# Patient Record
Sex: FEMALE | Race: WHITE | NOT HISPANIC OR LATINO | Employment: FULL TIME | URBAN - METROPOLITAN AREA
[De-identification: names, ages, dates, MRNs, and addresses within clinical notes are randomized per-mention and may not be internally consistent; named-entity substitution may affect disease eponyms.]

---

## 2020-06-01 ENCOUNTER — TRANSCRIBE ORDERS (OUTPATIENT)
Dept: FAMILY MEDICINE CLINIC | Facility: TELEHEALTH | Age: 25
End: 2020-06-01

## 2020-06-01 ENCOUNTER — HOSPITAL ENCOUNTER (OUTPATIENT)
Dept: GENERAL RADIOLOGY | Facility: HOSPITAL | Age: 25
Discharge: HOME | End: 2020-06-01

## 2020-06-01 DIAGNOSIS — S53.114A: Primary | ICD-10-CM

## 2020-06-01 DIAGNOSIS — S53.114A: ICD-10-CM

## 2020-06-01 PROCEDURE — 73090 X-RAY EXAM OF FOREARM: CPT

## 2020-06-01 PROCEDURE — 73090 X-RAY EXAM OF FOREARM: CPT | Performed by: RADIOLOGY

## 2020-06-01 PROCEDURE — 73080 X-RAY EXAM OF ELBOW: CPT | Performed by: RADIOLOGY

## 2020-06-01 PROCEDURE — 73080 X-RAY EXAM OF ELBOW: CPT

## 2020-06-22 ENCOUNTER — TRANSCRIBE ORDERS (OUTPATIENT)
Dept: PHYSICAL THERAPY | Facility: CLINIC | Age: 25
End: 2020-06-22

## 2020-06-22 DIAGNOSIS — S50.11XD CONTUSION OF RIGHT FOREARM, SUBSEQUENT ENCOUNTER: Primary | ICD-10-CM

## 2020-06-23 ENCOUNTER — TREATMENT (OUTPATIENT)
Dept: PHYSICAL THERAPY | Facility: CLINIC | Age: 25
End: 2020-06-23

## 2020-06-23 DIAGNOSIS — M79.631 RIGHT FOREARM PAIN: Primary | ICD-10-CM

## 2020-06-23 DIAGNOSIS — M25.521 RIGHT ELBOW PAIN: ICD-10-CM

## 2020-06-23 PROCEDURE — 97035 APP MDLTY 1+ULTRASOUND EA 15: CPT | Performed by: PHYSICAL THERAPIST

## 2020-06-23 PROCEDURE — 97162 PT EVAL MOD COMPLEX 30 MIN: CPT | Performed by: PHYSICAL THERAPIST

## 2020-06-23 NOTE — PROGRESS NOTES
"  Physical Therapy Initial Evaluation and Plan of Care      Patient: Sarah Delaney   : 1995  Diagnosis/ICD-10 Code:  Right forearm pain [M79.631]  Referring practitioner: SNEHAL Hampton  Date of Initial Visit: 2020  Today's Date: 2020  Patient seen for 1 sessions    Visit Diagnoses:    ICD-10-CM ICD-9-CM   1. Right forearm pain M79.631 729.5   2. Right elbow pain M25.521 719.42              Subjective Evaluation    History of Present Illness  Date of onset: 2020  Mechanism of injury: Pt is currently an employee at [employer]. She stated she was walking through the building and while \"rounding a corner\" she stepped on a \"metal linkage\" on the floor which slid out from under her, causing her to fall. She states she landed straight on her right upper extremity, causing right elbow and forearm pain. She stated she initially thought it was both elbow and forearm, but is now just forearm. The patient reported she immediately filed a claim with work and was seen by [facility] same day. She was seen by SNEHAL Noland and was given a week to see if swelling improved. She returned after a week and was then told to wait two more weeks to monitor improvement. The patient stated she continued to have right forearm and hand tingling with typing at work. She was seen at a follow-up appointment yesterday and referred to PT. The patient states she has had an x-ray performed, with no fractures revealed.    Quality of life: good    Pain  Current pain ratin  At best pain rating: 3  At worst pain ratin  Quality: discomfort, needle-like, sharp, tight, radiating and burning  Relieving factors: ice and medications  Aggravating factors: keyboarding, movement, lifting and sleeping    Hand dominance: right    Diagnostic Tests  X-ray: normal    Patient Goals  Patient goals for therapy: decreased pain and increased strength  Patient goal: Pain free at work     " "      Objective          Observations     Right Elbow   Negative for abrasion, deformity and spasms.     Additional Observation Details  \"Knot\" present on ulna, near olecranon. No tenderness to palpation of elbow.     Palpation     Right   No palpable tenderness to the supinator.   Muscle spasm in the pronator teres. Tenderness of the pronator teres, wrist extensors and wrist flexors.     Active Range of Motion     Right Elbow   Forearm supination: 32 degrees   Forearm pronation: WFL    Additional Active Range of Motion Details  Lacks three degrees from full right elbow extension.    Joint Play     Right Elbow   Joints within functional limits are the humeroulnar joint, humeroradial joint, proximal radioulnar joint and distal radioulnar joint.     Strength/Myotome Testing     Left Elbow   Flexion: 5  Extension: 5  Forearm supination: 5  Forearm pronation: 5    Right Elbow   Flexion: 4+  Extension: 4-  Forearm supination: 4  Forearm pronation: 4    Additional Strength Details  Pain with right elbow extension MMT.    Tests     Right Elbow   Negative pronator compression, Tinel's sign (cubital tunnel), valgus stress at 0 degrees and varus stress at 0 degrees.     Right Wrist/Hand   Positive Finkelstein's.   Negative Phalen's sign.      General Comments     Wrist/Hand Comments   (3 trials)    Right: 20 lbs, 15 lbs, 20 lbs = 18.3 lbs average    Left: 40 lbs, 26 lbs, 40 lbs = 35.3 lbs average         Assessment & Plan     Assessment  Impairments: abnormal muscle tone, abnormal or restricted ROM, impaired physical strength, lacks appropriate home exercise program and pain with function  Assessment details: The patient is a 24 year old female presenting to the clinic with right forearm pain. A palpable \"knot\" was present along the ulna, near the olecranon. Special tests for ligament involvement of the elbow were negative, as well as negative tests for carpal tunnel syndrome. The patient lacked three degrees of right " "elbow extension and limitation in right forearm supination. The patient reported \"shooting, tingling pain\" from the hand to elbow with prolonged typing at work. She demonstrated right upper extremity weakness, with 4-/5 and less  strength on the right compared to left (patient is right handed). On three trials, the patient averaged 18.3 pounds on the right and 35.3 pounds on the left. Today's evaluation concluded with therapeutic ultrasound to the right pronators, with no skin irritation observed. She would benefit from skilled physical therapy to address impaired range of motion, strength, , and functional independence.   Prognosis: good  Functional Limitations: carrying objects, lifting, sleeping, pulling, pushing and unable to perform repetitive tasks  Goals  Plan Goals: STG 3 weeks:  1. Pt will be instructed in HEP for improved independence.  2. Pt will report a maximum of 6/10 right upper extremity pain while working.  3. Pt will demonstrate an average of 25 pounds right  strength, for improved ability to perform daily activities.    LTG 6 weeks:  1. Pt will demonstrate right  strength equivalent to the left, for improved ability to perform daily tasks.  2. Pt will demonstrate full right elbow and forearm range of motion.  3. Pt will report less than 10% impairment on the Quick Dash for improved ability to perform work and daily activities.  4. Pt will report the ability to type at work, full shift, with no right upper extremity pain.    Plan  Therapy options: will be seen for skilled physical therapy services  Planned modality interventions: cryotherapy, electrical stimulation/Russian stimulation, high voltage pulsed current (pain management), iontophoresis, TENS, thermotherapy (hydrocollator packs) and ultrasound  Planned therapy interventions: fine motor coordination training, functional ROM exercises, home exercise program, joint mobilization, manual therapy, motor coordination training, " neuromuscular re-education, soft tissue mobilization, spinal/joint mobilization, strengthening, stretching, therapeutic activities and body mechanics training  Duration in visits: 20  Duration in weeks: 12  Treatment plan discussed with: patient  Plan details: Progress as tolerated per POC to improve to previous level of function.    Low Evaluation   86153  Moderate Evaluation  94930  High Evaluation   93787  Re-evaluation   57016    Therapeutic exercise  90497  Therapeutic activity    05589  Neuromuscular re-education   44509  Manual therapy   47874    Attended e-stim  30816  Unattended e-stim (Private)  81095  Moist heat/cryotherapy 60648   Ultrasound   76286  Iontophoresis   33479  Paraffin   45093        Manual Therapy:         mins  49947;  Therapeutic Exercise:         mins  29538;     Neuromuscular William:        mins  31035;    Therapeutic Activity:          mins  15753;     Gait Training:           mins  44150;     Ultrasound:    8     mins  71496;    Electrical Stimulation:         mins  13545 ( );  Dry Needling          mins self-pay    Timed Treatment:   8   mins   Total Treatment:     38   mins    PT SIGNATURE: Ashley Claudene Dalton, PT   DATE TREATMENT INITIATED: 6/23/2020    Initial Certification  Certification Period: 9/21/2020  I certify that the therapy services are furnished while this patient is under my care.  The services outlined above are required by this patient, and will be reviewed every 90 days.     PHYSICIAN: Ni Eckert, APRN      DATE:     Please sign and return via fax to [phone].. Thank you, [facility].

## 2020-06-24 ENCOUNTER — TREATMENT (OUTPATIENT)
Dept: PHYSICAL THERAPY | Facility: CLINIC | Age: 25
End: 2020-06-24

## 2020-06-24 DIAGNOSIS — M25.521 RIGHT ELBOW PAIN: ICD-10-CM

## 2020-06-24 DIAGNOSIS — M79.631 RIGHT FOREARM PAIN: Primary | ICD-10-CM

## 2020-06-24 PROCEDURE — 97014 ELECTRIC STIMULATION THERAPY: CPT | Performed by: PHYSICAL THERAPIST

## 2020-06-24 PROCEDURE — 97035 APP MDLTY 1+ULTRASOUND EA 15: CPT | Performed by: PHYSICAL THERAPIST

## 2020-06-24 PROCEDURE — 97110 THERAPEUTIC EXERCISES: CPT | Performed by: PHYSICAL THERAPIST

## 2020-06-24 NOTE — PROGRESS NOTES
Physical Therapy Daily Progress Note      Patient: Sarah Delaney   : 1995  Referring practitioner: SNEHAL Hampton  Date of Initial Visit: Type: THERAPY  Noted: 2020  Today's Date: 2020  Patient seen for 2 sessions             Subjective Evaluation    History of Present Illness    Subjective comment: Pt reports 6/10 right forearm pain prior to today's session. She states she went fishing yesterday which she believes resulted in increased soreness.Pain  Current pain ratin           Objective   See Exercise, Manual, and Modality Logs for complete treatment.       Assessment & Plan     Assessment  Assessment details: Today's session was initiated with moist heat and premod to the right forearm for pain relief and muscle relaxation. Therapeutic exercises addressed wrist/forearm muscle tightness and weakness. She reported fatigue with resisted exercises and mild discomfort. Muscle weakness was revealed with fasciculations. Therapeutic ultrasound was applied to the right forearm to conclude the session, with no skin irritation observed. With ultrasound, the patient reported tingling of the third and fourth digit, also extending into the shoulder, when pressure was applied on the muscle belly of the pronator teres. This will be addressed with manual therapy at next visit.    Plan  Plan details: Progress as tolerated.        Visit Diagnoses:    ICD-10-CM ICD-9-CM   1. Right forearm pain M79.631 729.5   2. Right elbow pain M25.521 719.42       Progress per Plan of Care           Timed:  Manual Therapy:        mins  38137;  Therapeutic Exercise:    25    mins  46364;     Neuromuscular William:      mins  17511;    Therapeutic Activity:          mins  60791;     Gait Training:           mins  94652;     Ultrasound:    8      mins  14679;    Electrical Stimulation:        mins  98631 ( );    Untimed:  Electrical Stimulation:    10     mins  93025 ( );  Mechanical Traction:          mins  55375;     Timed Treatment:  33    mins   Total Treatment:     43   mins      Ashley Claudene Dalton, PT  Physical Therapist

## 2020-06-26 ENCOUNTER — TREATMENT (OUTPATIENT)
Dept: PHYSICAL THERAPY | Facility: CLINIC | Age: 25
End: 2020-06-26

## 2020-06-26 DIAGNOSIS — M79.631 RIGHT FOREARM PAIN: Primary | ICD-10-CM

## 2020-06-26 DIAGNOSIS — M25.521 RIGHT ELBOW PAIN: ICD-10-CM

## 2020-06-26 PROCEDURE — 97140 MANUAL THERAPY 1/> REGIONS: CPT | Performed by: PHYSICAL THERAPIST

## 2020-06-26 PROCEDURE — 97110 THERAPEUTIC EXERCISES: CPT | Performed by: PHYSICAL THERAPIST

## 2020-06-26 PROCEDURE — 97014 ELECTRIC STIMULATION THERAPY: CPT | Performed by: PHYSICAL THERAPIST

## 2020-06-26 NOTE — PROGRESS NOTES
"   Physical Therapy Daily Progress Note      Patient: Sarah Delaney   : 1995  Referring practitioner: SNEHAL Hampton  Date of Initial Visit: Type: THERAPY  Noted: 2020  Today's Date: 2020  Patient seen for 3 sessions             Subjective Evaluation    History of Present Illness    Subjective comment: Pt reports 7/10 right forearm pain prior to today's session. She states they did more \"hands on, typing stuff\" at work yesterday and today, which has resulted in increased soreness. She currently denies numbness/tingling of the right upper extremity.Pain  Current pain ratin           Objective   See Exercise, Manual, and Modality Logs for complete treatment.       Assessment & Plan     Assessment  Assessment details: Today's session was initiated with estim for pain relief. Moist heat was held secondary to patient reported weight of moist heat hurt the upper extremity at last session. No skin irritation was observed following e-stim. Therapeutic exercises were performed to address range of motion and strength. Manual therapy included STM to the right forearm, with multiple knots revealed. Knots were tender to palpation and will continue to be addressed at future visits.    Plan  Plan details: Progress as tolerated.        Visit Diagnoses:    ICD-10-CM ICD-9-CM   1. Right forearm pain M79.631 729.5   2. Right elbow pain M25.521 719.42       Progress per Plan of Care           Timed:  Manual Therapy:    10     mins  05148;  Therapeutic Exercise:   24      mins  86480;     Neuromuscular William:        mins  96375;    Therapeutic Activity:          mins  29546;     Gait Training:           mins  61334;     Ultrasound:          mins  22236;    Electrical Stimulation:         mins  48004 ( );    Untimed:  Electrical Stimulation:  10   mins  91879 ( );  Mechanical Traction:         mins  59686;     Timed Treatment:   34    mins   Total Treatment:     44    mins     Lauren" Claudene Dalton, PT  Physical Therapist

## 2020-07-08 ENCOUNTER — TREATMENT (OUTPATIENT)
Dept: PHYSICAL THERAPY | Facility: CLINIC | Age: 25
End: 2020-07-08

## 2020-07-08 DIAGNOSIS — M25.521 RIGHT ELBOW PAIN: ICD-10-CM

## 2020-07-08 DIAGNOSIS — M79.631 RIGHT FOREARM PAIN: Primary | ICD-10-CM

## 2020-07-08 PROCEDURE — 97140 MANUAL THERAPY 1/> REGIONS: CPT | Performed by: PHYSICAL THERAPIST

## 2020-07-08 PROCEDURE — 97110 THERAPEUTIC EXERCISES: CPT | Performed by: PHYSICAL THERAPIST

## 2020-07-08 PROCEDURE — 97014 ELECTRIC STIMULATION THERAPY: CPT | Performed by: PHYSICAL THERAPIST

## 2020-07-08 NOTE — PROGRESS NOTES
"   Physical Therapy Daily Progress Note      Patient: Sarah Delaney   : 1995  Referring practitioner: SNEHAL Hampton  Date of Initial Visit: Type: THERAPY  Noted: 2020  Today's Date: 2020  Patient seen for 4 sessions             Subjective Evaluation    History of Present Illness    Subjective comment: Pt reports calling into work yesterday morning secondary to pain, stating she \"may have slept on it wrong\", with 8/10 intensity. She states pain is currently 3/10. Pain  Current pain rating: 3           Objective   See Exercise, Manual, and Modality Logs for complete treatment.       Assessment & Plan     Assessment  Assessment details: Manual therapy included STM to the right pronator teres, with multiple \"knots\" present. They were tender to palpation, per pt report, with tightness present throughout muscle belly. Pre-mod estim was applied to the right forearm for pain relief, with no skin irritation observed. The patient performed therapeutic exercises to address impaired range of motion, strength, endurance, and function. She reported being \"sore and tender\" following today's session. She will be progressed with increased manual therapy to address muscle tightness and knots.     Plan  Plan details: Progress per POC.        Visit Diagnoses:    ICD-10-CM ICD-9-CM   1. Right forearm pain M79.631 729.5   2. Right elbow pain M25.521 719.42       Progress per Plan of Care           Timed:  Manual Therapy:    11     mins  60606;  Therapeutic Exercise:   26    mins  03994;     Neuromuscular William:        mins  14676;    Therapeutic Activity:          mins  19355;     Gait Training:           mins  47845;     Ultrasound:          mins  20177;    Electrical Stimulation:         mins  85326 ( );    Untimed:  Electrical Stimulation:    10     mins  12350 ( );  Mechanical Traction:         mins  12499;     Timed Treatment:  37   mins   Total Treatment:     47    mins "       Ashley Claudene Dalton, PT  Physical Therapist

## 2020-07-09 ENCOUNTER — TREATMENT (OUTPATIENT)
Dept: PHYSICAL THERAPY | Facility: CLINIC | Age: 25
End: 2020-07-09

## 2020-07-09 DIAGNOSIS — M79.631 RIGHT FOREARM PAIN: Primary | ICD-10-CM

## 2020-07-09 DIAGNOSIS — M25.521 RIGHT ELBOW PAIN: ICD-10-CM

## 2020-07-09 PROCEDURE — 97110 THERAPEUTIC EXERCISES: CPT | Performed by: PHYSICAL THERAPIST

## 2020-07-09 PROCEDURE — 97035 APP MDLTY 1+ULTRASOUND EA 15: CPT | Performed by: PHYSICAL THERAPIST

## 2020-07-09 NOTE — PROGRESS NOTES
"   Physical Therapy Daily Progress Note      Patient: Sarah Delaney   : 1995  Referring practitioner: SNEHAL Hampton  Date of Initial Visit: Type: THERAPY  Noted: 2020  Today's Date: 2020  Patient seen for 5 sessions             Subjective Evaluation    History of Present Illness    Subjective comment: Pt reports 5/10 right forearm pain prior to today's session.Pain  Current pain ratin           Objective   See Exercise, Manual, and Modality Logs for complete treatment.       Assessment & Plan     Assessment  Assessment details: Therapeutic exercises were performed to address pronator teres tightness,  strength, and medial nerve glides. The patient was instructed how to perform median nerve glides at home, with patient demonstrating proper understanding. She reported \"feeling a good stretch\" while performing median nerve glides. Therapeutic ultrasound was performed for deep tissue heat, with no skin irritation observed. She will be progressed as tolerated for improved function.    Plan  Plan details: Progress per POC.        Visit Diagnoses:    ICD-10-CM ICD-9-CM   1. Right forearm pain M79.631 729.5   2. Right elbow pain M25.521 719.42       Progress per Plan of Care           Timed:  Manual Therapy:         mins  76521;  Therapeutic Exercise:    28     mins  20514;     Neuromuscular William:        mins  80998;    Therapeutic Activity:          mins  96513;     Gait Training:           mins  24798;     Ultrasound:    8      mins  50029;    Electrical Stimulation:         mins  30449 ( );    Untimed:  Electrical Stimulation:         mins  82346 ( );  Mechanical Traction:        mins  26824;     Timed Treatment:  36    mins   Total Treatment:     36   mins      Ashley Claudene Dalton, PT  Physical Therapist                  "

## 2020-07-20 ENCOUNTER — TREATMENT (OUTPATIENT)
Dept: PHYSICAL THERAPY | Facility: CLINIC | Age: 25
End: 2020-07-20

## 2020-07-20 DIAGNOSIS — M25.521 RIGHT ELBOW PAIN: ICD-10-CM

## 2020-07-20 DIAGNOSIS — M79.631 RIGHT FOREARM PAIN: Primary | ICD-10-CM

## 2020-07-20 PROCEDURE — 97110 THERAPEUTIC EXERCISES: CPT | Performed by: PHYSICAL THERAPIST

## 2020-07-20 PROCEDURE — 97140 MANUAL THERAPY 1/> REGIONS: CPT | Performed by: PHYSICAL THERAPIST

## 2020-07-20 NOTE — PROGRESS NOTES
"   Physical Therapy Daily Progress Note      Patient: Sarah Delaney   : 1995  Referring practitioner: SNEHAL Hampton  Date of Initial Visit: Type: THERAPY  Noted: 2020  Today's Date: 2020  Patient seen for 6 sessions           Subjective Evaluation    History of Present Illness    Subjective comment: Pt reported 3/10 right forearm pain prior to today's session. She stated she experiences right hand/arm tingling while \"shooting guns, but not normally\".Pain  Current pain rating: 3           Objective   See Exercise, Manual, and Modality Logs for complete treatment.       Assessment & Plan     Assessment  Assessment details: Pt performed therapeutic exercises with reports of \"soreness and fatigue\". STM was performed on the right forearm, with several \"knots\" present. The patient reported tenderness with STM. She will be progressed with increased resistance as tolerated, however fasciculation is present with red thera-band at this time.      Plan  Plan details: Progress as tolerated to improve strength and function.        Visit Diagnoses:    ICD-10-CM ICD-9-CM   1. Right forearm pain M79.631 729.5   2. Right elbow pain M25.521 719.42       Progress per Plan of Care           Timed:  Manual Therapy:   11      mins  64350;  Therapeutic Exercise:   24      mins  17958;     Neuromuscular William:        mins  24991;    Therapeutic Activity:          mins  95049;     Gait Training:           mins  80347;     Ultrasound:          mins  44776;    Electrical Stimulation:        mins  51190 ( );    Untimed:  Electrical Stimulation:         mins  94690 ( );  Mechanical Traction:         mins  16865;     Timed Treatment:  35    mins   Total Treatment:    35    mins      Ashley Claudene Dalton, PT  Physical Therapist                  "

## 2020-07-22 ENCOUNTER — TREATMENT (OUTPATIENT)
Dept: PHYSICAL THERAPY | Facility: CLINIC | Age: 25
End: 2020-07-22

## 2020-07-22 DIAGNOSIS — M25.521 RIGHT ELBOW PAIN: ICD-10-CM

## 2020-07-22 DIAGNOSIS — M79.631 RIGHT FOREARM PAIN: Primary | ICD-10-CM

## 2020-07-22 PROCEDURE — 97140 MANUAL THERAPY 1/> REGIONS: CPT | Performed by: PHYSICAL THERAPIST

## 2020-07-22 PROCEDURE — 97110 THERAPEUTIC EXERCISES: CPT | Performed by: PHYSICAL THERAPIST

## 2020-07-22 NOTE — PROGRESS NOTES
Physical Therapy Daily Progress Note      Patient: Sarah Delaney   : 1995  Referring practitioner: SNEHAL Hampton  Date of Initial Visit: Type: THERAPY  Noted: 2020  Today's Date: 2020  Patient seen for 7 sessions           Subjective Evaluation    History of Present Illness    Subjective comment: Pt reported 0/10 right upper extremity pain prior to today's session. She stated has gone fishing the last two days and notices right arm pain when casting with the right upper extremity. However, if she casts left handed she experiences no pain.Pain  Current pain ratin           Objective   See Exercise, Manual, and Modality Logs for complete treatment.       Assessment & Plan     Assessment  Assessment details: Today's session was progressed to include thera-band resistance, increasing from red to green. She tolerated progression well, with no reports of increased pain and mild fatigue. STM was performed along the lateral aspect of the radius, with significantly improved muscle integrity, with few knots present. Knots were present along the lateral aspect of the ulna, with pain reported. However, it improved with STM. She will continue to be progressed with STM and resistance for improved strength and function.    Plan  Plan details: Progress as tolerated per POC for improved function.        Visit Diagnoses:    ICD-10-CM ICD-9-CM   1. Right forearm pain M79.631 729.5   2. Right elbow pain M25.521 719.42       Progress per Plan of Care           Timed:  Manual Therapy:    14     mins  39786;  Therapeutic Exercise:    25     mins  21265;     Neuromuscular William:        mins  72179;    Therapeutic Activity:          mins  34702;     Gait Training:           mins  44883;     Ultrasound:          mins  52785;    Electrical Stimulation:         mins  00949 ( );    Untimed:  Electrical Stimulation:         mins  24010 ( );  Mechanical Traction:         mins  01943;      Timed Treatment:  39    mins   Total Treatment:     39   mins      Ashley Claudene Dalton, PT  Physical Therapist

## 2020-07-29 ENCOUNTER — TREATMENT (OUTPATIENT)
Dept: PHYSICAL THERAPY | Facility: CLINIC | Age: 25
End: 2020-07-29

## 2020-07-29 DIAGNOSIS — M25.521 RIGHT ELBOW PAIN: ICD-10-CM

## 2020-07-29 DIAGNOSIS — M79.631 RIGHT FOREARM PAIN: Primary | ICD-10-CM

## 2020-07-29 PROCEDURE — 97110 THERAPEUTIC EXERCISES: CPT | Performed by: PHYSICAL THERAPIST

## 2020-07-29 PROCEDURE — 97140 MANUAL THERAPY 1/> REGIONS: CPT | Performed by: PHYSICAL THERAPIST

## 2020-07-31 ENCOUNTER — DOCUMENTATION (OUTPATIENT)
Dept: PHYSICAL THERAPY | Facility: CLINIC | Age: 25
End: 2020-07-31

## 2020-07-31 DIAGNOSIS — M25.521 RIGHT ELBOW PAIN: ICD-10-CM

## 2020-07-31 DIAGNOSIS — M79.631 RIGHT FOREARM PAIN: Primary | ICD-10-CM

## 2020-07-31 NOTE — PROGRESS NOTES
Discharge Summary  Discharge Summary from Physical Therapy Report    Patient Information  Sarah Delaney  1995    Dates  PT Evaluation: 6/23/2020  Number of Visits: 8       Goals: Unable to assess secondary to patient requesting discharge via phone.    Visit Diagnoses:    ICD-10-CM ICD-9-CM   1. Right forearm pain M79.631 729.5   2. Right elbow pain M25.521 719.42       Discharge Plan: Continue with current home exercise program as instructed    Comments: The patient was evaluated on 6/23/2020 and attended eight sessions, including evaluation. The patient progressed well, gaining mobility, strength, and function. Discharge planning was going to be discussed at today's treatment session, 7/31/2020, however patient called and requested discharge prior to today's treatment session. Therefore, outcome measures were unable to be assessed.     Date of Discharge: 7/31/2020        Ashley Claudene Dalton, PT  Physical Therapist

## 2021-03-18 ENCOUNTER — BULK ORDERING (OUTPATIENT)
Dept: CASE MANAGEMENT | Facility: OTHER | Age: 26
End: 2021-03-18

## 2021-03-18 DIAGNOSIS — Z23 IMMUNIZATION DUE: ICD-10-CM

## 2025-03-05 ENCOUNTER — HOSPITAL ENCOUNTER (OUTPATIENT)
Dept: ULTRASOUND IMAGING | Facility: HOSPITAL | Age: 30
Discharge: HOME | End: 2025-03-05
Admitting: PHYSICIAN ASSISTANT
Payer: MEDICAID

## 2025-03-05 DIAGNOSIS — N64.52 NIPPLE DISCHARGE: ICD-10-CM

## 2025-03-05 PROCEDURE — 76642 ULTRASOUND BREAST LIMITED: CPT

## 2025-05-20 NOTE — PROGRESS NOTES
"   Physical Therapy Daily Progress Note      Patient: Sarah Delaney   : 1995  Referring practitioner: SNEHAL Hampton  Date of Initial Visit: Type: THERAPY  Noted: 2020  Today's Date: 2020  Patient seen for 8 sessions         Subjective Evaluation    History of Present Illness    Subjective comment: Pt reports 0/10 right forearm pain prior to today's treatment session. She states she has not experienced pain for approximately a week. The patient reported that when fishing, she is unable to cast with her right arm secondary to pain.Pain  Current pain ratin           Objective   See Exercise, Manual, and Modality Logs for complete treatment.       Assessment & Plan     Assessment  Assessment details: Today's session included the introduction of dowel parvez rolling, for strengthening/endurance of forearm musculature. The patient reported fatigue following the activity, but no increase in pain. STM was performed on the right forearm to address \"knots\" along the right radius and ulna. Pt reported tenderness with STM, but stated \"it helps a lot\". The patient will be progressed with resistance, endurance, and continued manual therapy to fully restore function.    Plan  Plan details: Progress per POC.        Visit Diagnoses:    ICD-10-CM ICD-9-CM   1. Right forearm pain M79.631 729.5   2. Right elbow pain M25.521 719.42       Progress per Plan of Care           Timed:  Manual Therapy:   12      mins  77169;  Therapeutic Exercise:   31      mins  10841;     Neuromuscular William:        mins  49516;    Therapeutic Activity:          mins  03912;     Gait Training:           mins  63624;     Ultrasound:          mins  65871;    Electrical Stimulation:         mins  54290 ( );    Untimed:  Electrical Stimulation:         mins  89781 ( );  Mechanical Traction:         mins  13255;     Timed Treatment:   43   mins   Total Treatment:     43   mins    Ashley Claudene Dalton, " PT  Physical Therapist                   [Vaccines Reviewed] : Immunizations reviewed today. Please see immunization details in the vaccine log within the immunization flowsheet.